# Patient Record
Sex: FEMALE | Race: WHITE | NOT HISPANIC OR LATINO | URBAN - METROPOLITAN AREA
[De-identification: names, ages, dates, MRNs, and addresses within clinical notes are randomized per-mention and may not be internally consistent; named-entity substitution may affect disease eponyms.]

---

## 2020-07-05 ENCOUNTER — OFFICE VISIT (OUTPATIENT)
Dept: URGENT CARE | Facility: CLINIC | Age: 50
End: 2020-07-05

## 2020-07-05 VITALS — HEART RATE: 77 BPM | OXYGEN SATURATION: 95 % | TEMPERATURE: 98.3 F | RESPIRATION RATE: 18 BRPM

## 2020-07-05 DIAGNOSIS — Z11.59 SCREENING FOR VIRAL DISEASE: Primary | ICD-10-CM

## 2020-07-05 PROCEDURE — 99202 OFFICE O/P NEW SF 15 MIN: CPT | Performed by: PHYSICIAN ASSISTANT

## 2020-07-05 PROCEDURE — U0003 INFECTIOUS AGENT DETECTION BY NUCLEIC ACID (DNA OR RNA); SEVERE ACUTE RESPIRATORY SYNDROME CORONAVIRUS 2 (SARS-COV-2) (CORONAVIRUS DISEASE [COVID-19]), AMPLIFIED PROBE TECHNIQUE, MAKING USE OF HIGH THROUGHPUT TECHNOLOGIES AS DESCRIBED BY CMS-2020-01-R: HCPCS | Performed by: PHYSICIAN ASSISTANT

## 2020-07-05 RX ORDER — PYRIDOSTIGMINE BROMIDE 60 MG/1
TABLET ORAL
COMMUNITY
Start: 2020-04-10

## 2020-07-05 RX ORDER — MIDODRINE HYDROCHLORIDE 10 MG/1
TABLET ORAL
COMMUNITY
Start: 2020-06-10

## 2020-07-05 RX ORDER — FLUDROCORTISONE ACETATE 0.1 MG/1
TABLET ORAL
COMMUNITY
Start: 2020-05-11

## 2020-07-05 RX ORDER — NADOLOL 20 MG/1
TABLET ORAL
COMMUNITY
Start: 2020-06-29

## 2020-07-05 RX ORDER — DESMOPRESSIN ACETATE 0.1 MG/1
TABLET ORAL
COMMUNITY
Start: 2020-05-11

## 2020-07-05 RX ORDER — AMITRIPTYLINE HYDROCHLORIDE 25 MG/1
TABLET, FILM COATED ORAL
COMMUNITY
Start: 2020-05-18

## 2020-07-05 NOTE — PROGRESS NOTES
[address]        NAME: Angie Boykin is a 45 y o  female  : 1970    MRN: [id number]  DATE: 2020  TIME: 12:31 PM    Assessment and Plan   Screening for viral disease [Z11 59]  1  Screening for viral disease  Novel Coronavirus (COVID-19), PCR [facility]    CANCELED: MISC COVID-19 TEST- Office Collection         Patient Instructions       Pt will be swabbed for COVID-19 today  Was given quarantine instructions  She will be notified of result once obtained  Chief Complaint     Chief Complaint   Patient presents with    COVID-19     Travel screening for covid         History of Present Illness       Pt presents requesting COVID-19 testing today  She is going to be travelling  She is asymptomatic and feeling well today  Review of Systems   Review of Systems   Constitutional: Negative  Respiratory: Negative  Cardiovascular: Negative  Gastrointestinal: Negative  Genitourinary: Negative  Current Medications       Current Outpatient Medications:     amitriptyline (ELAVIL) 25 mg tablet, , Disp: , Rfl:     desmopressin (DDAVP) 0 1 mg tablet, , Disp: , Rfl:     fludrocortisone (FLORINEF) 0 1 mg tablet, , Disp: , Rfl:     midodrine (PROAMATINE) 10 MG tablet, , Disp: , Rfl:     nadolol (CORGARD) 20 mg tablet, , Disp: , Rfl:     pyridostigmine (MESTINON) 60 mg tablet, , Disp: , Rfl:     Current Allergies     Allergies as of 2020    (No Known Allergies)            The following portions of the patient's history were reviewed and updated as appropriate: allergies, current medications, past family history, past medical history, past social history, past surgical history and problem list      Past Medical History:   Diagnosis Date    Autonomic instability     Orthostatic hypotension        Past Surgical History:   Procedure Laterality Date     SECTION         History reviewed  No pertinent family history        Medications have been verified  Objective   Pulse 77   Temp 98 3 °F (36 8 °C)   Resp 18   SpO2 95%        Physical Exam     Physical Exam   Constitutional: She is oriented to person, place, and time  She appears well-developed and well-nourished  No distress  Cardiovascular: Normal rate, regular rhythm and normal heart sounds  No murmur heard  Pulmonary/Chest: Effort normal and breath sounds normal  No respiratory distress  Neurological: She is alert and oriented to person, place, and time  Psychiatric: She has a normal mood and affect  Vitals reviewed

## 2020-07-11 LAB — SARS-COV-2 RNA SPEC QL NAA+PROBE: NOT DETECTED

## 2020-07-12 ENCOUNTER — TELEPHONE (OUTPATIENT)
Dept: OTHER | Facility: OTHER | Age: 50
End: 2020-07-12

## 2020-07-12 NOTE — TELEPHONE ENCOUNTER
Your test for COVID-19, also known as novel coronavirus, came back negative  You do not have COVID-19  If you have any additional questions, we can schedule a virtual visit for you with a provider or call the [facility] [phone] Option 7 for care advice  For additional information , please visit the Coronavirus FAQ on the [address]  ([facility])  Patient stated that she received her results from My Chart